# Patient Record
(demographics unavailable — no encounter records)

---

## 2019-10-27 NOTE — PCM.PNPP
- General Info


Date of Service: 10/27/19


Functional Status: Reports: Pain Controlled





- Review of Systems


General: Reports: No Symptoms


HEENT: Reports: No Symptoms


Pulmonary: Reports: No Symptoms


Cardiovascular: Reports: No Symptoms


Gastrointestinal: Reports: No Symptoms


Genitourinary: Reports: No Symptoms


Musculoskeletal: Reports: No Symptoms


Skin: Reports: No Symptoms


Neurological: Reports: No Symptoms


Psychiatric: Reports: No Symptoms





- General Info


Date of Service: 10/27/19





- Patient Data


Vital Signs - Most Recent: 


 Last Vital Signs











Temp  97.7 F   10/26/19 23:18


 


Pulse  79   10/27/19 01:30


 


Resp  12   10/26/19 23:42


 


BP  115/72   10/27/19 01:30


 


Pulse Ox  100   10/26/19 23:42











Weight - Most Recent: 103.873 kg


I&O - Last 24 Hours: 


 Intake & Output











 10/26/19 10/27/19 10/27/19





 22:59 06:59 14:59


 


Intake Total  1507 


 


Output Total  1000 850


 


Balance  507 -850











Lab Results - Last 24 Hours: 


 Laboratory Results - last 24 hr











  10/26/19 10/27/19 Range/Units





  20:50 06:15 


 


WBC   16.8 H  (4.5-12.0)  X10-3/uL


 


RBC   3.51  (3.23-5.20)  x10(6)uL


 


Hgb   9.8 L  (11.5-15.5)  g/dL


 


Hct   29.4 L  (30.0-51.3)  %


 


MCV   83.6  (80-96)  fL


 


MCH   28.0  (27.7-33.6)  pg


 


MCHC   33.5  (32.2-35.4)  g/dL


 


RDW   16.0 H  (11.5-15.5)  %


 


Plt Count   238  (125-369)  X10(3)uL


 


MPV   8.5  (7.4-10.4)  fL


 


Add Manual Diff   Yes  


 


Neutrophils % (Manual)   86 H  (46-82)  %


 


Band Neutrophils %   1  (0-6)  %


 


Lymphocytes % (Manual)   10 L  (13-37)  %


 


Monocytes % (Manual)   3 L  (4-12)  %


 


Anisocytosis   Occasional  


 


Urine Color  Yellow   (YELLOW)  


 


Urine Appearance  Clear   (CLEAR)  


 


Urine pH  5.0   (5.0-6.5)  


 


Ur Specific Gravity  1.020   (1.010-1.025)  


 


Urine Protein  Negative   (NEGATIVE)  mg/dL


 


Urine Glucose (UA)  Normal   (NORMAL)  mg/dL


 


Urine Ketones  15 H   (NEGATIVE)  mg/dL


 


Urine Occult Blood  Negative   (NEGATIVE)  


 


Urine Nitrite  Negative   (NEGATIVE)  


 


Urine Bilirubin  Negative   (NEGATIVE)  


 


Urine Urobilinogen  Normal   (NEGATIVE)  mg/dL


 


Ur Leukocyte Esterase  Negative   (NEGATIVE)  


 


Urine RBC  Not seen   (0-5)  


 


Urine WBC  0-5   (0-5)  


 


Ur Squamous Epith Cells  Moderate H   (NS,R,O)  


 


Urine Bacteria  Few H   (NS)  











Med Orders - Current: 


 Current Medications





Diphenhydramine HCl (Benadryl)  25 mg IVPUSH Q6H PRN


   PRN Reason: Itching or Nausea


Diphenhydramine HCl (Benadryl)  25 mg IV ONETIME PRN


   PRN Reason: Pruritus


Ephedrine Sulfate (Ephedrine Sulfate)  5 mg IVPUSH ASDIRECTED PRN


   PRN Reason: Other


Lactated Ringer's (Ringers, Lactated)  1,000 mls @ 125 mls/hr IV ASDIRECTED [facility]


   Last Admin: 10/26/19 21:37 Dose:  125 mls/hr


Lactated Ringer's (Ringers, Lactated)  1,000 mls @ 250 mls/hr IV ASDIRECTED [facility]


   Last Admin: 10/27/19 10:50 Dose:  250 mls/hr


Ketorolac Tromethamine (Toradol)  30 mg IVPUSH Q6H [facility]


   Stop: 10/31/19 23:20


   Last Admin: 10/27/19 11:13 Dose:  30 mg


Nalbuphine HCl (Nubain)  10 mg IVPUSH Q1H PRN


   PRN Reason: Pruritus


Naloxone HCl (Narcan)  0.1 mg IVPUSH ONETIME PRN


   PRN Reason: Respiratory Depression


Naloxone HCl (Narcan)  0.1 mg IVPUSH ONETIME PRN


   PRN Reason: Oversedation


Ondansetron HCl (Zofran)  4 mg IVPUSH Q6H PRN


   PRN Reason: Nausea/Vomiting


Sodium Chloride (Saline Flush)  10 ml FLUSH ASDIRECTED PRN


   PRN Reason: Keep Vein Open





Discontinued Medications





Cefazolin Sodium (Ancef)  2 gm IVPUSH ONETIME STA


   Stop: 10/26/19 21:17


   Last Admin: 10/26/19 21:38 Dose:  2 gm


Citric Acid/Sodium Citrate (Bicitra Solution)  30 ml PO ONETIME STA


   Stop: 10/26/19 21:17


   Last Admin: 10/26/19 21:37 Dose:  30 ml


Ketorolac Tromethamine (Toradol)  30 mg IVPUSH Q6H PRN


   PRN Reason: Pain


   Stop: 10/31/19 23:20


Scopolamine (Transderm-Scop)  1.5 mg TRDERM ONETIME ONE


   Stop: 10/26/19 21:18


   Last Admin: 10/26/19 21:37 Dose:  1.5 mg











- Infant Interaction


Infant Disposition, Postpartum: [place] in Room with Family


Support Person: 





- Postpartum Recovery Exam


Fundal Tone: Firm


Fundal Level: At Umbilicus


Fundal Placement: Midline


Lochia Amount: Moderate


Lochia Color: Rubra/Red


Perineum Description: Edematous


Episiotomy/Laceration: None


Bladder Status: Indwelling Catheter in Place


Urinary Elimination: Indwelling Catheter





- Exam


General: Alert, Oriented


HEENT: Pupils Equal


Neck: Supple


Lungs: Clear to Auscultation, Normal Respiratory Effort


Cardiovascular: Regular Rate, Regular Rhythm


GI/Abdominal Exam: Normal Bowel Sounds, Soft, Non-Tender, No Organomegaly, No 

Distention, No Abnormal Bruit, No Mass, Pelvis Stable


Extremities: Normal Inspection, Normal Range of Motion, Non-Tender, No Pedal 

Edema, Normal Capillary Refill


Skin: Warm, Dry, Intact


Wound/Incisions: Healing Well


Neurological: No New Focal Deficit


Psy/Mental Status: Alert, Normal Affect, Normal Mood





- Problem List & Annotations


(1) Previous  section


SNOMED Code(s): 103306864


   Code(s): Z98.891 - HISTORY OF UTERINE SCAR FROM PREVIOUS SURGERY   Status: 

Acute   Current Visit: No   





(2) S/P repeat low transverse 


SNOMED Code(s): 141819323, 27349785, 266652586, 882958381, 803575231


   Code(s): Z98.891 - HISTORY OF UTERINE SCAR FROM PREVIOUS SURGERY   Status: 

Acute   Current Visit: No   





(3) ADHD


SNOMED Code(s): 140228386


   Code(s): F90.9 - ATTENTION-DEFICIT HYPERACTIVITY DISORDER, UNSPECIFIED TYPE 

  Status: Acute   Current Visit: No   


Qualifiers: 


 





- Problem List Review


Problem List Initiated/Reviewed/Updated: Yes





- My Orders


Last 24 Hours: 


My Active Orders





10/26/19 19:36


Resuscitation Status Routine 





10/26/19 21:30


Lactated Ringers [Ringers, Lactated] 1,000 ml IV ASDIRECTED 





10/26/19 21:33


Admission Status [Patient Status] [ADT] Routine 


Sodium Chloride 0.9% [Saline Flush]   10 ml FLUSH ASDIRECTED PRN 


Peripheral IV Insertion Adult [OM.PC] Routine 





10/26/19 23:12


Intake and Output [RC] 06,14,22 


RT Incentive Spirometry [RC] Q4HWA 


Vital Signs [RC] PER UNIT ROUTINE 


Naloxone [Narcan]   0.1 mg IVPUSH ONETIME PRN 


diphenhydrAMINE [Benadryl]   25 mg IVPUSH Q6H PRN 


ePHEDrine [ePHEDrine sulfate]   5 mg IVPUSH ASDIRECTED PRN 





10/26/19 23:15


Lactated Ringers [Ringers, Lactated] 1,000 ml IV ASDIRECTED 





10/27/19 05:00


Ketorolac [Toradol]   30 mg IVPUSH Q6H 





10/27/19 14:37


Remove Michael Catheter [Urinary Catheter Removal] [RC] Per Unit Routine 


Acetaminophen/oxyCODONE [Percocet 325-5 MG]   1 tab PO Q4H PRN 





10/27/19 14:38


Discontinue Saline Lock [Peripheral IV Discontinue] [OM.PC] Routine 





10/27/19 14:45


Ibuprofen [Motrin]   600 mg PO Q6H 





10/27/19 Lunch


Regular Diet [DIET] 














- Plan


Plan:: 


Advance diet. DC Fluids and DC Michael.Percocet for pain

## 2019-10-27 NOTE — OR
DATE OF OPERATION:  10/26/2019

 

SURGEON:  Jay Valencia MD

 

ASSISTANT:  Sid Back MD.

 

PREOPERATIVE DIAGNOSES:

1. Intrauterine pregnancy at 37 weeks and 4 days.

2. Polyhydramnios.

3. LGA (large-for-gestational-age).

4. Adderall use in pregnancy.

5. Repeat  section.

 

POSTOPERATIVE DIAGNOSES:

1. Intrauterine pregnancy at term.

2. Repeat lower uterine segment section.

3. Polyhydramnios.

4. LGA (large-for-gestational-age).

 

PROCEDURE PERFORMED:  Repeat low transverse  section.

 

ANESTHESIA:  Epidural.

 

ESTIMATED BLOOD LOSS:  650 mL.

 

COMPLICATIONS:  None.

 

FINDINGS:

1. A female infant in cephalic presentation.

2. Extensive adhesions.

 

INDICATIONS:  This is a 31-year-old female who came in to Labor at 37 and half

weeks.  She was scheduled for repeat  next week.  She has had

polyhydramnios during the pregnancy and has used Adderall during pregnancy.  She

has otherwise been unremarkable.  The procedure was described to the patient in

detail including the possible risks of bleeding, infection, injury to the

surrounding organs, and possible need for further surgery.  Informed consent was

obtained accordingly.

 

DESCRIPTION OF PROCEDURE:  She was taken to the OR, where epidural anesthesia

was placed and found to be adequate.  She was prepped and draped in the usual

sterile fashion.  A Pfannenstiel incision was made along the previous scar and

carried to the underlying layer of fascia using Brandon scissors.  The fascia was

incised in the middle by scalpel and the underlying rectus muscles were

dissected off bluntly and using Brandon scissors repeating that in the inferior

portion as well.  The rectus muscle was dissected in the middle and the uterus

was entered bluntly.  A bladder blade was inserted and bladder reflection of the

vesicouterine peritoneum was placed.  Using a knife, a lower uterine transverse

incision was made along the lower portion of the uterus.  This was extended

using the surgeon's fingers.  The baby's head was delivered.  The rest of the

baby delivered without incident.  A nuchal cord was encountered and reduced.

After the baby was handed to the awaiting nurses, cord blood was obtained and

the rest of the placenta was removed intact.  The uterus was exteriorized and

the incision closed in 2 layers.  One figure-of-eight stitch was needed to

complete hemostasis.  It was returned to the abdomen and irrigation was

performed.  Thereafter, the rectus fascia was closed.  Some difficulty

encountered during the closure with some adhesions, but this was reduced and

closed adequately with 0 Vicryl.  The subcutaneous tissue and skin were closed

with a running stitch of 3-0.  The sponges, lap, and instruments count was

correct x3.  The patient was stable in the operating room and was taken to the

recovery afterwards in stable condition.

 

Job#: [id number]

DD: 10/26/2019 2312

DT: 10/27/2019 0236 TN/MODL

## 2019-10-28 NOTE — DISCH
DISCHARGE DATE:  10/28/2019

 

REASON FOR ADMISSION:  Labor at term.

 

DISCHARGE DIAGNOSES:

1. Repeat .

2. Attention deficit hyperactivity disorder.

3. Depression.

 

BRIEF HISTORY AND HOSPITAL COURSE:  A 31-year-old female was admitted at 37-1/2

weeks after complaining of uterine contractions.  She was scheduled for an

elective  section on the .  As such, we did the  on the

 by myself and Dr. Back.  Postoperatively, she did very well, and today she

has no complaints with the exception of some pain.  The bleeding has improved.

She has no constipation and she has been walking around for the last 24 hours.

She asks to go home today.

 

DISCHARGE MEDICATIONS:  She will go home on;

1. Percocet 1 tablet every 6 hours p.r.n., 15 tablets.

2. Motrin 600 mg p.o. q.i.d.

3. She will also continue the regular home medications.

 

FOLLOWUP:  She will see me in 2 weeks.

 

DISCHARGE TIME:  35 minutes.

 

Job#: [id number]

DD: 10/28/2019 0745

DT: 10/28/2019 1355 TN/MAUREEN

## 2019-10-30 NOTE — PCM.LDHP
L&D History of Present Illness





- General


Date of Service: 10/26/19


Admit Problem/Dx: 


 Patient Status Order with Admit Dx/Problem





10/26/19 19:36


Patient Status [ADT] Routine 





10/26/19 21:33


Admission Status [Patient Status] [ADT] Routine 








 Admission Diagnosis/Problem











Admission Diagnosis/Problem    Pregnancy














Source of Information: Patient


History Limitations: Reports: No Limitations





- History of Present Illness


Introduction:: 


30 yo  at 37 and 1/2 weeks here because of active labor. Symptoms started 

earlier in the evening,and she reports contractions every 3-5 min. She is 

scheduled for an elective repeat  C section on 10/30/2019. She has mild 

polyhydramnios, and ADHD,MDD ,stable. She takes Adderall,even during this 

pregnancy.Denies vaginal leakage of fluid or bleeding.


Timing/Duration: Reports: gradual onset


Location, Pregnancy: Reports: Uterus


Quality: Reports: Ache


Pain Score: 7


Improves with: Reports: None


Worsens with: Reports: None


Associated Symptoms: Denies: vaginal bleeding, vaginal clots, vaginal tissue





- Related Data


Allergies/Adverse Reactions: 


 Allergies











Allergy/AdvReac Type Severity Reaction Status Date / Time


 


lamotrigine [From Lamictal] Allergy  Rash Verified 10/26/19 23:00











Home Medications: 


 Home Meds





PNV95/Ferrous Fumarate/FA [Prenatal Tablet] 1 each PO DAILY 12/13/15 [History]


Dextroamphetamine/Amphetamine [Adderall Xr 30 mg Capsule] 1 cap PO DAILY  [History]


Sertraline [Zoloft] 50 mg PO DAILY 17 [History]


Ibuprofen [Motrin] 600 mg PO Q6H PRN #28 tab 18 [Rx]


Dextroamphetamine/Amphetamine [Adderall 10 mg Tablet] 10 mg PO DAILY 10/27/19 [

History]


Acetaminophen/oxyCODONE [Percocet 325-5 MG] 1 tab PO Q4H PRN #15 tablet 10/28/

19 [Rx]


Ibuprofen [Motrin] 600 mg PO Q6H  tablet 10/28/19 [Rx]











Past Medical History





- Past Health History


Medical/Surgical History: Denies Medical/Surgical History


OB/GYN History: Reports: Pregnancy, Spontaneous , Other (See Below)


Other OB/BYN History: , cervical dysplasia


Psychiatric History: Reports: ADHD, Other (See Below)


Other Psychiatric History: POSTPARTUM DEPRESSION, hx of nicotine dependence but 

patient quit smoking.


Dermatologic History: Reports: Eczema





- Infectious Disease History


Infectious Disease History: Reports: Chicken Pox





- Past Surgical History


Female  Surgical History: Reports:  Section





Social & Family History





- Family History


Family Medical History: Noncontributory


Respiratory: Reports: COPD


OBGYN: Reports: Pregnancy


Neurological: Reports: CVA


Psychiatric: Reports: Other (See Below)


Other Psychiatric Family History: Alcholism


Endocrine/Metabolic: Reports: Diabetes, Type I


Oncologic: Reports: Non-Hodgkin's Lymphoma





- Tobacco Use


Smoking Status *Q: Former Smoker


Used Tobacco, but Quit: Yes


Month/Year Tobacco Last Used: 


Second Hand Smoke Exposure: No





- Caffeine Use


Caffeine Use: Reports: Coffee





- Recreational Drug Use


Recreational Drug Use: No





H&P Review of Systems





- Review of Systems:


Review Of Systems: ROS reveals no pertinent complaints other than HPI.





L&D Exam





- Exam


Exam: See Below





- Vital Signs


Vital Signs: 


 Last Vital Signs











Temp  98.5 F   10/28/19 08:00


 


Pulse  98   10/28/19 08:00


 


Resp  17   10/28/19 08:00


 


BP  125/66   10/28/19 08:00


 


Pulse Ox  98   10/28/19 08:00











Weight: 103.873 kg





- OB Specific


Contraction Duration (sec): 30-50


Contraction Frequency (min): 2-6


Contraction Intensity: Mild to Moderate


Fetal Movement: Active


Fetal Heart Tones: Present


Fetal Heart Rate (FHR) Variability: Moderate (6-25 bmp)


Birth Presentation: Right Occiput Anterior (LORI)





- Smith Score


Smith Score Cervix Position: Midposition


Smith Score Consistency: Medium


Smith Score Dilation: 1-2 cm





- Exam


General: Alert, Oriented


HEENT: PERRLA, Conjunctiva Clear, EACs Clear, EOMI, Hearing Intact, Mucosa 

Moist & Pink, Nares Patent, Normal Nasal Septum, Posterior Pharynx Clear, TMs 

Clear


Neck: Supple, Trachea Midline


Lungs: Clear to Auscultation, Normal Respiratory Effort


Cardiovascular: Regular Rate, Regular Rhythm


GI/Abdominal Exam: Normal Bowel Sounds, Soft, Non-Tender, No Organomegaly, No 

Distention, No Abnormal Bruit, No Mass, Pelvis Stable


Rectal Exam: Normal Exam, Normal Rectal Tone


Genitourinary: Normal external exam, Normal bimanual exam, Normal speculum exam


Back Exam: Normal Inspection, Full Range of Motion


Extremities: Normal Inspection, Normal Range of Motion, Non-Tender, No Pedal 

Edema, Normal Capillary Refill


Skin: Warm, Dry, Intact


Neurological: Cranial Nerves Intact, Reflexes Equal Bilateral


Psychiatric: Alert, Normal Affect, Normal Mood





- Patient Data


Result Diagrams: 


 10/27/19 06:15








- Problem List


(1) Previous  section


SNOMED Code(s): 249264700


   ICD Code: Z98.891 - HISTORY OF UTERINE SCAR FROM PREVIOUS SURGERY   Status: 

Acute   





(2) S/P repeat low transverse 


SNOMED Code(s): 238082235, 59418560, 842724393, 121676632, 423261882


   ICD Code: Z98.891 - HISTORY OF UTERINE SCAR FROM PREVIOUS SURGERY   Status: 

Acute   





(3) ADHD


SNOMED Code(s): 327441502


   ICD Code: F90.9 - ATTENTION-DEFICIT HYPERACTIVITY DISORDER, UNSPECIFIED TYPE

   Status: Acute   


Qualifiers: 


 





(4) Term pregnancy


SNOMED Code(s): 92601710


   ICD Code: Z34.90 - ENCNTR FOR SUPRVSN OF NORMAL PREGNANCY, UNSP, UNSP 

TRIMESTER   Status: Acute   





(5) Polyhydramnios affecting pregnancy


SNOMED Code(s): 57576846, 093602107


   ICD Code: O40.9XX0 - POLYHYDRAMNIOS, UNSP TRIMESTER, NOT APPLICABLE OR UNSP 

  Status: Acute   





(6) S/P repeat low transverse 


SNOMED Code(s): 212752167, 53413458, 704085645, 094401210, 747869629


   ICD Code: Z98.891 - HISTORY OF UTERINE SCAR FROM PREVIOUS SURGERY   Status: 

Acute   





(7) Active labor at term


SNOMED Code(s): 76327356


   ICD Code: KER3012 -    Status: Acute   


Problem List Initiated/Reviewed/Updated: Yes


Assessment/Plan Comment:: 


Proceed for C section. Patient aware of risks and benefits